# Patient Record
Sex: FEMALE | Race: WHITE
[De-identification: names, ages, dates, MRNs, and addresses within clinical notes are randomized per-mention and may not be internally consistent; named-entity substitution may affect disease eponyms.]

---

## 2018-05-04 ENCOUNTER — HOSPITAL ENCOUNTER (INPATIENT)
Dept: HOSPITAL 96 - M.ERS | Age: 28
LOS: 1 days | Discharge: HOME | DRG: 101 | End: 2018-05-05
Attending: FAMILY MEDICINE | Admitting: FAMILY MEDICINE
Payer: COMMERCIAL

## 2018-05-04 VITALS — SYSTOLIC BLOOD PRESSURE: 114 MMHG | DIASTOLIC BLOOD PRESSURE: 59 MMHG

## 2018-05-04 VITALS — DIASTOLIC BLOOD PRESSURE: 61 MMHG | SYSTOLIC BLOOD PRESSURE: 127 MMHG

## 2018-05-04 VITALS — SYSTOLIC BLOOD PRESSURE: 100 MMHG | DIASTOLIC BLOOD PRESSURE: 68 MMHG

## 2018-05-04 VITALS — DIASTOLIC BLOOD PRESSURE: 56 MMHG | SYSTOLIC BLOOD PRESSURE: 91 MMHG

## 2018-05-04 VITALS — DIASTOLIC BLOOD PRESSURE: 52 MMHG | SYSTOLIC BLOOD PRESSURE: 103 MMHG

## 2018-05-04 VITALS — WEIGHT: 122 LBS | BODY MASS INDEX: 23.03 KG/M2 | HEIGHT: 60.98 IN

## 2018-05-04 DIAGNOSIS — Z87.828: ICD-10-CM

## 2018-05-04 DIAGNOSIS — Z88.8: ICD-10-CM

## 2018-05-04 DIAGNOSIS — Z88.6: ICD-10-CM

## 2018-05-04 DIAGNOSIS — G40.909: Primary | ICD-10-CM

## 2018-05-04 DIAGNOSIS — F17.210: ICD-10-CM

## 2018-05-04 LAB
ABSOLUTE BASOPHILS: 0 THOU/UL (ref 0–0.2)
ABSOLUTE EOSINOPHILS: 0.1 THOU/UL (ref 0–0.7)
ABSOLUTE MONOCYTES: 0.5 THOU/UL (ref 0–1.2)
ALBUMIN SERPL-MCNC: 4 G/DL (ref 3.4–5)
ALP SERPL-CCNC: 41 U/L (ref 46–116)
ALT SERPL-CCNC: 17 U/L (ref 30–65)
ANION GAP SERPL CALC-SCNC: 12 MMOL/L (ref 7–16)
AST SERPL-CCNC: 14 U/L (ref 15–37)
BACTERIA-REFLEX: (no result) /HPF
BASOPHILS NFR BLD AUTO: 0.5 %
BILIRUB SERPL-MCNC: 0.7 MG/DL
BILIRUB UR-MCNC: NEGATIVE MG/DL
BUN SERPL-MCNC: 9 MG/DL (ref 7–18)
CALCIUM SERPL-MCNC: 8.8 MG/DL (ref 8.5–10.1)
CHLORIDE SERPL-SCNC: 103 MMOL/L (ref 98–107)
CO2 SERPL-SCNC: 22 MMOL/L (ref 21–32)
COLOR UR: (no result)
CREAT SERPL-MCNC: 0.8 MG/DL (ref 0.6–1.3)
EOSINOPHIL NFR BLD: 0.7 %
GLUCOSE SERPL-MCNC: 108 MG/DL (ref 70–99)
GRANULOCYTES NFR BLD MANUAL: 76.2 %
HCT VFR BLD CALC: 39.5 % (ref 37–47)
HGB BLD-MCNC: 13.6 GM/DL (ref 12–15)
KETONES UR STRIP-MCNC: (no result) MG/DL
LYMPHOCYTES # BLD: 1.2 THOU/UL (ref 0.8–5.3)
LYMPHOCYTES NFR BLD AUTO: 15.9 %
MCH RBC QN AUTO: 31.8 PG (ref 26–34)
MCHC RBC AUTO-ENTMCNC: 34.5 G/DL (ref 28–37)
MCV RBC: 92.2 FL (ref 80–100)
MONOCYTES NFR BLD: 6.7 %
MPV: 8.1 FL. (ref 7.2–11.1)
MUCUS: (no result) STRN/LPF
NEUTROPHILS # BLD: 5.8 THOU/UL (ref 1.6–8.1)
NUCLEATED RBCS: 0 /100WBC
PLATELET COUNT*: 275 THOU/UL (ref 150–400)
POTASSIUM SERPL-SCNC: 3.8 MMOL/L (ref 3.5–5.1)
PROT SERPL-MCNC: 7.3 G/DL (ref 6.4–8.2)
PROT UR QL STRIP: (no result)
RBC # BLD AUTO: 4.29 MIL/UL (ref 4.2–5)
RBC # UR STRIP: (no result) /UL
RDW-CV: 12.4 % (ref 10.5–14.5)
SODIUM SERPL-SCNC: 137 MMOL/L (ref 136–145)
SP GR UR STRIP: 1.02 (ref 1–1.03)
SQUAMOUS: (no result) /LPF (ref 0–3)
THC: POSITIVE
TROPONIN-I LEVEL: <0.06 NG/ML (ref ?–0.06)
URINE CLARITY: (no result)
URINE GLUCOSE-RANDOM: NEGATIVE
URINE LEUKOCYTES-REFLEX: (no result)
URINE NITRITE-REFLEX: NEGATIVE
URINE WBC-REFLEX: (no result) /HPF (ref 0–5)
UROBILINOGEN UR STRIP-ACNC: 0.2 E.U./DL (ref 0.2–1)
WBC # BLD AUTO: 7.6 THOU/UL (ref 4–11)

## 2018-05-04 NOTE — EKG
[place] [address]
Phone:  [phone]                     ELECTROCARDIOGRAM REPORT      
_______________________________________________________________________________
 
Name:       MANUELITO CAPPS                Room:           [address] [place]  
[facility]#:  [id number]      Account #:      [id number]  
Admission:  18     Attend Phys:    Kodi Hernandez, 
Discharge:               Date of Birth:  [birth year]  
         Report #: [id number]
    [id number]
_______________________________________________________________________________
THIS REPORT FOR:  //name//                      
 
                         [facility] ED
                                       
Test Date:    2018               Test Time:    06:22:34
Pat Name:     MANUELITO GÉNESIS            Department:   
Patient ID:   [id number]            Room:          
Gender:       F                        Technician:   STACIE MEIER
:          1990               Requested By: Edward Evans
Order Number: [id number] MD:   Jarred Bright
                                 Measurements
Intervals                              Axis          
Rate:         64                       P:            73
WA:           165                      QRS:          52
QRSD:         87                       T:            62
QT:           393                                    
QTc:          406                                    
                           Interpretive Statements
Sinus rhythm
RSR' in V1 or V2, probably normal variant
No previous ECG available for comparison
 
Electronically Signed On 2018 11:05:20 CDT by Jarred Bright
[email or web address]
 
 
 
 
 
 
 
 
 
 
 
 
 
 
 
 
 
 
 
  <ELECTRONICALLY SIGNED>
                                           By: Jarred Bright MD, [facility]      
  18     1105
D: 18   _____________________________________
T: 18   Jarred Bright MD, FACC        /EPI

## 2018-05-05 VITALS — DIASTOLIC BLOOD PRESSURE: 65 MMHG | SYSTOLIC BLOOD PRESSURE: 95 MMHG

## 2018-05-05 VITALS — SYSTOLIC BLOOD PRESSURE: 93 MMHG | DIASTOLIC BLOOD PRESSURE: 60 MMHG

## 2018-05-05 VITALS — SYSTOLIC BLOOD PRESSURE: 108 MMHG | DIASTOLIC BLOOD PRESSURE: 70 MMHG

## 2018-05-05 VITALS — SYSTOLIC BLOOD PRESSURE: 108 MMHG | DIASTOLIC BLOOD PRESSURE: 71 MMHG

## 2018-05-05 VITALS — SYSTOLIC BLOOD PRESSURE: 97 MMHG | DIASTOLIC BLOOD PRESSURE: 60 MMHG

## 2018-05-08 NOTE — EEG
[address]  [address]                    EEG STUDY REPORT              
_______________________________________________________________________________
 
Name:       MANUELITO CAPPS MARTA                Room:           [address] [place]  
M.R.#:  [id number]      Account #:      [id number]  
Admission:  05/04/18     Attend Phys:    Kodi Hernandez, 
Discharge:  05/05/18     Date of Birth:  [birth year]  
         Report #: [id number]
                                                                     [id number]  
_______________________________________________________________________________
THIS REPORT FOR:  //name//                      
 
CC: Ama Kevyn Hernandez
 
DATE OF SERVICE:  05/04/2018
 
 
This patient is being evaluated for the possibility of seizure.  EEG was done by
placing the electrode by standard 10/20 system of electrode placement.  Both
referential and sequential montages were used for recording.  Background
activity in this patient's EEG is about 11 Hz and 40 microvolt.  This is a very
well formed background activity.  The patient went to sleep multiple times and
that is associated with bilaterally symmetrical sleep spindle, vertex sharp
waves, K complexes.  Photic stimulation is unremarkable.  During one portion of
this EEG, it would appear that the patient showed bursts of sharp and slow
complexes.
 
IMPRESSION:  This patient's EEG appeared to be showing one burst of sharper and
slow activity that can be consistent with a seizure disorder if clinically
correlated.  However, the finding is difficult to totally separate from the
artifact and therefore, clinical correlation is recommended.
 
I discussed that aspect with the patient.  Because of completely unprovoked
seizures and this EEG finding, my recommendation was to go on anticonvulsant,
but if she does that, it should be indefinitely or at least for long time.  She
should also take folic acid and avoid any pregnancy.  I discussed all this
aspect with the patient in great detail and she said she wants to think about it
and will make her decision by tomorrow.  That is why, I did not prescribe any
medication and we have discussed and I think we will probably prescribe Keppra
if she decides to go on medications.
 
 
 
 
 
 
 
 
 
 
 
 
 
<ELECTRONICALLY SIGNED>
                                        By:  Ronny Wilson MD             
05/08/18     2154
D: 05/04/18 2013_______________________________________
T: 05/04/18 2027Ronny Wilson MD                /nt

## 2018-05-08 NOTE — CON
[address]  [address]                    CONSULTATION                  
_______________________________________________________________________________
 
Name:       MANUELITO CAPPS                Room:           [address] [place]  
M.R.#:  [id number]      Account #:      [id number]  
Admission:  05/04/18     Attend Phys:    Kodi Hernandez, 
Discharge:  05/05/18     Date of Birth:  [birth year]  
         Report #: [id number]
                                                                     [id number]  
_______________________________________________________________________________
THIS REPORT FOR:  //name//                      
 
CC: Ama Kevyn Hernandez
 
DATE OF SERVICE:  05/04/2018
 
 
HISTORY OF PRESENT ILLNESS:  This is a 28-year-old female patient who was
evaluated by me for new onset seizure.  The patient does not remember anything
about that.  She gives a history that she was with the boyfriend.  She made a
loud noise and then she started shaking.  It lasted 2-3 minutes.  There was a
well-defined postictal period.  She had tongue biting and urinary incontinence
during this episode.  This is the history she provided to me and she is pretty
definite about this history.  That history is somewhat different than the
documented in the Emergency Room notes, so I asked her a few times and she said
she does have a slight tongue bite and she had urinary incontinence.  She does
not drink much alcohol except once a while.  She does have some headache, but
overall she is getting better.
 
REVIEW OF SYSTEMS:  I carried out 14-point review of system and this patient
does not have any prior history of stroke.  She is healthy and she does not take
any diabetic medication.  At one time, she took tramadol, that was long time ago
and that was for very short duration and she does not even know why she took
that.  She denies using any recent medication.
 
PAST MEDICAL HISTORY:  Negative for seizure.
 
FAMILY HISTORY:  Negative for any early age strokes.
 
SOCIAL HISTORY:  She smokes marijuana, but she does not use any cocaine or
amphetamine.
 
PHYSICAL EXAMINATION:  Indicate she is alert, responsive, able to follow simple
and complex command.  Cranial nerve examination 2-12 looks unremarkable.  She
has a symmetrical strength, sensation, reflexes and tones in all 4 extremities. 
There is no cerebellar sign.  I could not have a very good look at the patient's
fundus.  Her cardiac examinations appear unremarkable.  She does not appear to
have any respiratory difficulty.  She is a moderately built individual who does
not have any dysmorphic features of eyes, ears and face.  Blood pressure is
103/52, respirations 16, pulse is 66, temperature is 97.8.
 
IMPRESSION:  Clinically, this patient appeared to have seizure.  This is a new
onset seizure and there is really no etiology for the patient's symptoms.  I
discussed the situation with her and I told her we will await the workup, but
since there is no obvious factor for her seizure, she needs to consider either
 
 
 
[place] [address]                    CONSULTATION                  
_______________________________________________________________________________
 
Name:       MANUELITO CAPPS                Room:           [address] [place]  
M.R.#:  [id number]      Account #:      [id number]  
Admission:  05/04/18     Attend Phys:    Kodi Hernandez, 
Discharge:  05/05/18     Date of Birth:  [birth year]  
         Report #: [id number]
                                                                     [id number]  
_______________________________________________________________________________
Keppra or Lamictal as an anticonvulsant.  However, they all have teratogenic
side effects.  She needs to take that into consideration.  She wants to think
about that.  After first seizure, we usually have an option whether to put the
patient on medication or wait to see if it happens again or not.  This seizure
looks completely unprovoked.  Because of that, the possibility of seizure
medication needs to be considered and she will think about it.
 
RECOMMENDATIONS:
1.  EEG.
2.  MRI of the brain with and without contrast.
3.  She needs to take seizure precautions.  I told that the laws in [place] will not let or allow to drive for 6 months.  She also should not work
near moving machinery, on heights, near fire or any place where she can hurt
herself if she has a seizure.  She understood those well.  We will talk more
about putting on medication tomorrow after she _____ think about it.
 
Thank you very much for this referral and the nurses were told to take seizure
precaution.
 
 
 
 
 
 
 
 
 
 
 
 
 
 
 
 
 
 
 
 
 
 
 
 
 
 
<ELECTRONICALLY SIGNED>
                                        By:  Ronny Wilson MD             
05/08/18     2154
D: 05/04/18 1324_______________________________________
T: 05/04/18 Molina Wilson MD                /nt